# Patient Record
Sex: FEMALE | Race: OTHER | NOT HISPANIC OR LATINO
[De-identification: names, ages, dates, MRNs, and addresses within clinical notes are randomized per-mention and may not be internally consistent; named-entity substitution may affect disease eponyms.]

---

## 2017-11-06 ENCOUNTER — APPOINTMENT (OUTPATIENT)
Dept: OPHTHALMOLOGY | Facility: CLINIC | Age: 52
End: 2017-11-06
Payer: MEDICARE

## 2017-11-06 PROBLEM — Z00.00 ENCOUNTER FOR PREVENTIVE HEALTH EXAMINATION: Status: ACTIVE | Noted: 2017-11-06

## 2017-11-06 PROCEDURE — 92014 COMPRE OPH EXAM EST PT 1/>: CPT

## 2018-02-12 ENCOUNTER — APPOINTMENT (OUTPATIENT)
Dept: OPHTHALMOLOGY | Facility: CLINIC | Age: 53
End: 2018-02-12

## 2018-10-31 ENCOUNTER — OUTPATIENT (OUTPATIENT)
Dept: OUTPATIENT SERVICES | Facility: HOSPITAL | Age: 53
LOS: 1 days | End: 2018-10-31
Payer: MEDICARE

## 2018-10-31 ENCOUNTER — APPOINTMENT (OUTPATIENT)
Dept: ULTRASOUND IMAGING | Facility: HOSPITAL | Age: 53
End: 2018-10-31
Payer: MEDICARE

## 2018-10-31 VITALS
HEART RATE: 93 BPM | OXYGEN SATURATION: 98 % | TEMPERATURE: 97 F | SYSTOLIC BLOOD PRESSURE: 149 MMHG | WEIGHT: 229.06 LBS | RESPIRATION RATE: 16 BRPM | HEIGHT: 65 IN | DIASTOLIC BLOOD PRESSURE: 85 MMHG

## 2018-10-31 PROCEDURE — 19281 PERQ DEVICE BREAST 1ST IMAG: CPT

## 2018-10-31 PROCEDURE — C1739: CPT

## 2018-10-31 PROCEDURE — 19281 PERQ DEVICE BREAST 1ST IMAG: CPT | Mod: LT

## 2018-11-01 ENCOUNTER — RESULT REVIEW (OUTPATIENT)
Age: 53
End: 2018-11-01

## 2018-11-01 ENCOUNTER — OUTPATIENT (OUTPATIENT)
Dept: OUTPATIENT SERVICES | Facility: HOSPITAL | Age: 53
LOS: 1 days | Discharge: HOME | End: 2018-11-01
Payer: MEDICARE

## 2018-11-01 VITALS
HEART RATE: 84 BPM | SYSTOLIC BLOOD PRESSURE: 142 MMHG | OXYGEN SATURATION: 97 % | RESPIRATION RATE: 23 BRPM | TEMPERATURE: 96 F | DIASTOLIC BLOOD PRESSURE: 88 MMHG

## 2018-11-01 DIAGNOSIS — Z41.9 ENCOUNTER FOR PROCEDURE FOR PURPOSES OTHER THAN REMEDYING HEALTH STATE, UNSPECIFIED: Chronic | ICD-10-CM

## 2018-11-01 LAB
GLUCOSE BLDC GLUCOMTR-MCNC: 278 MG/DL — HIGH (ref 70–99)
GLUCOSE BLDC GLUCOMTR-MCNC: 288 MG/DL — HIGH (ref 70–99)
GLUCOSE BLDC GLUCOMTR-MCNC: 307 MG/DL — HIGH (ref 70–99)

## 2018-11-01 PROCEDURE — 88341 IMHCHEM/IMCYTCHM EA ADD ANTB: CPT

## 2018-11-01 PROCEDURE — 88307 TISSUE EXAM BY PATHOLOGIST: CPT

## 2018-11-01 PROCEDURE — 82962 GLUCOSE BLOOD TEST: CPT

## 2018-11-01 PROCEDURE — 76098 X-RAY EXAM SURGICAL SPECIMEN: CPT | Mod: 26

## 2018-11-01 PROCEDURE — 88305 TISSUE EXAM BY PATHOLOGIST: CPT

## 2018-11-01 PROCEDURE — 76098 X-RAY EXAM SURGICAL SPECIMEN: CPT

## 2018-11-01 PROCEDURE — 19301 PARTIAL MASTECTOMY: CPT | Mod: LT

## 2018-11-01 RX ORDER — ACETAMINOPHEN 500 MG
650 TABLET ORAL EVERY 6 HOURS
Qty: 0 | Refills: 0 | Status: DISCONTINUED | OUTPATIENT
Start: 2018-11-01 | End: 2018-11-01

## 2018-11-01 NOTE — BRIEF OPERATIVE NOTE - OPERATION/FINDINGS
Lurdes localization  Left breast lumpectomy, additional anterior margin taken  Intraop specimen radiology confirmed clips and lurdes reflector within specimen

## 2018-11-01 NOTE — BRIEF OPERATIVE NOTE - PROCEDURE
<<-----Click on this checkbox to enter Procedure Left breast lumpectomy  11/01/2018    Active  [id number]

## 2018-11-05 LAB — SURGICAL PATHOLOGY STUDY: SIGNIFICANT CHANGE UP

## 2021-08-25 PROBLEM — E11.9 TYPE 2 DIABETES MELLITUS WITHOUT COMPLICATIONS: Chronic | Status: ACTIVE | Noted: 2018-10-31

## 2021-08-25 PROBLEM — N63.20 UNSPECIFIED LUMP IN THE LEFT BREAST, UNSPECIFIED QUADRANT: Chronic | Status: ACTIVE | Noted: 2018-10-31

## 2021-08-25 PROBLEM — N64.4 MASTODYNIA: Chronic | Status: ACTIVE | Noted: 2018-10-31

## 2021-08-25 PROBLEM — I10 ESSENTIAL (PRIMARY) HYPERTENSION: Chronic | Status: ACTIVE | Noted: 2018-10-31

## 2021-08-28 DIAGNOSIS — Z01.818 ENCOUNTER FOR OTHER PREPROCEDURAL EXAMINATION: ICD-10-CM

## 2021-08-29 ENCOUNTER — APPOINTMENT (OUTPATIENT)
Dept: DISASTER EMERGENCY | Facility: CLINIC | Age: 56
End: 2021-08-29

## 2021-08-30 LAB — SARS-COV-2 N GENE NPH QL NAA+PROBE: NOT DETECTED

## 2022-05-13 ENCOUNTER — APPOINTMENT (OUTPATIENT)
Dept: NEUROLOGY | Facility: CLINIC | Age: 57
End: 2022-05-13
Payer: MEDICARE

## 2022-05-13 VITALS
HEIGHT: 65 IN | DIASTOLIC BLOOD PRESSURE: 85 MMHG | HEART RATE: 93 BPM | SYSTOLIC BLOOD PRESSURE: 152 MMHG | RESPIRATION RATE: 16 BRPM

## 2022-05-13 VITALS — BODY MASS INDEX: 37.11 KG/M2 | WEIGHT: 223 LBS

## 2022-05-13 DIAGNOSIS — G25.0 ESSENTIAL TREMOR: ICD-10-CM

## 2022-05-13 PROCEDURE — 99205 OFFICE O/P NEW HI 60 MIN: CPT

## 2022-05-13 NOTE — DISCUSSION/SUMMARY
[FreeTextEntry1] : This is a 56-year-old female with diabetes who presents with chief complaints of essential tremors.  These tremors have been present for a long time.  Over the years patient has seen multiple neurologists.  She states that she is unable to tolerate a higher dose of primidone at the current dose she is not having side effects but in the past 1 tablet made her dizzy she is not sure exactly what dose it was.\par She is on propanolol 60 mg daily and has diabetes with fluctuating blood glucose levels\par \par Impression-essential tremor\par \par Various treatment options were discussed with her in detail including increasing primidone, trial of Topamax, gabapentin could also be considered\par Surgical options including DBS and focused ultrasound were discussed\par From her last neurologist she just had an MRI brain done 2 days ago.  Results are not back, requested to send results to our office\par At present patient is not interested in either increasing primidone or adding any new medication.  She is definitely averse to any surgical option  or focused ultrasound.\par Patient wishes to think about the various options, in case she decides to pursue with any she will schedule a follow-up visit with the clinic\par All of her questions were addressed and answered

## 2022-05-13 NOTE — HISTORY OF PRESENT ILLNESS
[FreeTextEntry1] : This is a 56-year-old female who presented with chief complaints of essential tremor.  At this visit she is accompanied by her mother and by her manager Vikram from the residential facility.  For patients with mild learning disability\par \par History is obtained from all of them.  Patient has been diagnosed with essential tremor for many years.  The tremor is mostly with activity especially when she is trying to eat or pick a glass full of liquid.  Occasionally head tremor is present.  There is no family history of essential tremor.  Patient has seen several neurologists for this and states that she recently a few days ago had another MRI of her brain.  In the past they have told her that she does not have Parkinson's disease.  She has tried higher doses of primidone but was not able to tolerate more than 1 pill exact dose is unclear.\par \par She is diabetic with fluctuating blood sugars and finds that tremors are aggravated when her blood sugars are uncontrolled and when she is stressed.  She states that lately she has been under significant stress.  She denies any difficulty swallowing no drooling.  She does not write any more.\par \par Current medications for tremor include propanolol 60 mg daily and primidone 50 mg, half a tablet at bedtime\par \par Review of systems-a complete review of systems is performed and is negative except as listed in HPI\par \par Family history unremarkable for movement disorders.  She is the only child and her parents do not have any movement disorders

## 2022-05-13 NOTE — PHYSICAL EXAM
[FreeTextEntry1] : On exam patient is awake and alert.  She is pleasant cooperative with the exam.  Face is symmetric\par Patient gives her own history\par Slight no-no kind of head tremor is intermittently seen\par No resting tremors\par Patient has mild bilateral postural tremors but much more pronounced bilateral intention tremor\par No increase in tone\par No dysmetria or dysdiadochokinesia\par No difficulty getting up from the chair\par Spirals are scanned in EHR.  They are almost impossible to do with the right quite wavy on the left.  Patient was unable to write

## 2022-09-13 ENCOUNTER — APPOINTMENT (OUTPATIENT)
Dept: NEUROLOGY | Facility: CLINIC | Age: 57
End: 2022-09-13